# Patient Record
Sex: MALE | Race: WHITE | URBAN - METROPOLITAN AREA
[De-identification: names, ages, dates, MRNs, and addresses within clinical notes are randomized per-mention and may not be internally consistent; named-entity substitution may affect disease eponyms.]

---

## 2017-02-28 DIAGNOSIS — R05.9 COUGH: Primary | ICD-10-CM

## 2017-02-28 NOTE — PROGRESS NOTES
Verbal Order with read back per Dr. Heidy Hodge MD  For PFT smart panel. AMB POC PFT complete w/ bronchodilator  AMB POC PFT complete w/o bronchodilator    Dr. Heidy Hodge MD will co-sign the orders.

## 2017-03-06 ENCOUNTER — OFFICE VISIT (OUTPATIENT)
Dept: PULMONOLOGY | Age: 62
End: 2017-03-06

## 2017-03-06 VITALS
SYSTOLIC BLOOD PRESSURE: 150 MMHG | TEMPERATURE: 98.2 F | HEART RATE: 78 BPM | WEIGHT: 212 LBS | RESPIRATION RATE: 20 BRPM | BODY MASS INDEX: 33.27 KG/M2 | HEIGHT: 67 IN | OXYGEN SATURATION: 96 % | DIASTOLIC BLOOD PRESSURE: 90 MMHG

## 2017-03-06 DIAGNOSIS — R05.9 COUGH: ICD-10-CM

## 2017-03-06 DIAGNOSIS — J30.9 CHRONIC ALLERGIC RHINITIS: ICD-10-CM

## 2017-03-06 DIAGNOSIS — R05.8 UPPER AIRWAY COUGH SYNDROME: Primary | ICD-10-CM

## 2017-03-06 RX ORDER — FLUTICASONE PROPIONATE 50 MCG
2 SPRAY, SUSPENSION (ML) NASAL DAILY
Qty: 1 BOTTLE | Refills: 3 | Status: SHIPPED | OUTPATIENT
Start: 2017-03-06

## 2017-03-06 RX ORDER — FLUTICASONE PROPIONATE 50 MCG
2 SPRAY, SUSPENSION (ML) NASAL DAILY
Qty: 1 BOTTLE | Refills: 3 | Status: SHIPPED | OUTPATIENT
Start: 2017-03-06 | End: 2017-03-06 | Stop reason: CLARIF

## 2017-03-06 RX ORDER — ATORVASTATIN CALCIUM 40 MG/1
TABLET, FILM COATED ORAL DAILY
COMMUNITY

## 2017-03-06 RX ORDER — ESOMEPRAZOLE MAGNESIUM 40 MG/1
CAPSULE, DELAYED RELEASE ORAL DAILY
COMMUNITY

## 2017-03-06 NOTE — MR AVS SNAPSHOT
Visit Information Date & Time Provider Department Dept. Phone Encounter #  
 3/6/2017  3:00 PM Yang Medrano MD [facility] [id number] Upcoming Health Maintenance Date Due Hepatitis C Screening 1955 DTaP/Tdap/Td series (1 - Tdap) 8/1/1978 FOBT Q 1 YEAR AGE 50-75 8/1/2007 INFLUENZA AGE 9 TO ADULT 8/1/2016 Allergies as of 3/6/2017  Review Complete On: 3/6/2017 By: Yang Medrano MD  
  
 Severity Noted Reaction Type Reactions Penicillins Low 03/06/2017   Side Effect Rash Current Immunizations  Never Reviewed No immunizations on file. Not reviewed this visit You Were Diagnosed With   
  
 Codes Comments Upper airway cough syndrome    -  Primary ICD-10-CM: D70 ICD-9-CM: 786.2 Cough     ICD-10-CM: R05 ICD-9-CM: 728. 2 Chronic allergic rhinitis     ICD-10-CM: J30.9 ICD-9-CM: 477.9 Vitals BP Pulse Temp Resp Height(growth percentile) Weight(growth percentile) 150/90 (BP 1 Location: Left arm, BP Patient Position: At rest) 78 98.2 °F (36.8 °C) (Oral) 20 5' 7\" (1.702 m) 212 lb (96.2 kg) SpO2 BMI Smoking Status 96% 33.2 kg/m2 Never Smoker BMI and BSA Data Body Mass Index Body Surface Area  
 33.2 kg/m 2 2.13 m 2 Preferred Pharmacy Pharmacy Name Phone [facility], [address] [place] [address] [phone] Your Updated Medication List  
  
   
This list is accurate as of: 3/6/17  3:43 PM.  Always use your most recent med list.  
  
  
  
  
 esomeprazole 40 mg capsule Commonly known as:  [place] Take  by mouth daily. fluticasone 50 mcg/actuation nasal spray Commonly known as:  Domnick Means 2 Sprays by Both Nostrils route daily. LIPITOR 40 mg tablet Generic drug:  atorvastatin Take  by mouth daily. ZOCOR 80 mg tablet Generic drug:  simvastatin Take 80 mg by mouth nightly. ZOLOFT 50 mg tablet Generic drug:  sertraline Take  by mouth daily. Prescriptions Sent to Pharmacy Refills  
 fluticasone (FLONASE) 50 mcg/actuation nasal spray 3 Si Sprays by Both Nostrils route daily. Class: Normal  
 Pharmacy: [facility] [address] [place] [address], [address] [place] [address] #: [phone] Route: Both Nostrils We Performed the Following AMB POC PFT COMPLETE W/O BRONCHODILATOR [26631 CPT(R)] Introducing [facility] SERVICES! Reece Wade introduces [employer] patient portal. Now you can access parts of your medical record, email your doctor's office, and request medication refills online. 1. In your internet browser, go to [email or web address] 2. Click on the First Time User? Click Here link in the Sign In box. You will see the New Member Sign Up page. 3. Enter your TuneCore Access Code exactly as it appears below. You will not need to use this code after youve completed the sign-up process. If you do not sign up before the expiration date, you must request a new code. · TuneCore Access Code: [email or web address] Expires: 2017 10:09 AM 
 
4. Enter the last four digits of your Social Security Number (xxxx) and Date of Birth (mm/dd/yyyy) as indicated and click Submit. You will be taken to the next sign-up page. 5. Create a TuneCore ID. This will be your TuneCore login ID and cannot be changed, so think of one that is secure and easy to remember. 6. Create a TuneCore password. You can change your password at any time. 7. Enter your Password Reset Question and Answer. This can be used at a later time if you forget your password. 8. Enter your e-mail address. You will receive e-mail notification when new information is available in [address] 9. Click Sign Up. You can now view and download portions of your medical record.  
10. Click the Download Summary menu link to download a portable copy of your medical information. If you have questions, please visit the Frequently Asked Questions section of the Dapt website. Remember, Dapt is NOT to be used for urgent needs. For medical emergencies, dial 911. Now available from your iPhone and Android! Please provide this summary of care documentation to your next provider. If you have any questions after today's visit, please call [phone].

## 2017-03-06 NOTE — PROGRESS NOTES
HISTORY OF PRESENT ILLNESS  Ranjit Grandchild is a 63 y.o. male. HPI Comments: Pt presents with a dry bothersome cough with onset about 4 months ago. Seen in local urgent care, CXR done and started on antibiotics and cough suppressants without relief. Pt with occasional sternal pressure but no actual chest pain. Other associated symptoms include sinus pressure with HA and congestion, frequent sneezing and rhinorrhea. Pt also describes a feeling of \"stuff running down the back of my throat\". Pt with history of GERD on Nexium. Denies audible wheezing or SOB. Cough does not wake him up from sleep. Cough   The history is provided by the patient. This is a chronic problem. Episode onset: 4 months. The problem occurs daily. The problem has not changed since onset. Pertinent negatives include no chest pain, no abdominal pain, no headaches and no shortness of breath. Associated symptoms comments: Sternal pressure. Exacerbated by: unknown. Nothing relieves the symptoms. Treatments tried: antibiotics x 2 , Tessalon perles and Codeine. The treatment provided no relief. Review of Systems   Constitutional: Negative for chills, diaphoresis, fever, malaise/fatigue and weight loss. Weight gain   HENT: Positive for congestion. Negative for ear discharge, ear pain, hearing loss, nosebleeds, sore throat and tinnitus. Eyes: Positive for blurred vision. Negative for double vision, photophobia, pain, discharge and redness. Respiratory: Positive for cough. Negative for hemoptysis, sputum production, shortness of breath, wheezing and stridor. Cardiovascular: Negative for chest pain, palpitations, orthopnea, leg swelling and PND. Gastrointestinal: Negative for abdominal pain, heartburn, nausea and vomiting. Musculoskeletal: Negative for back pain, myalgias and neck pain. Skin: Negative for itching and rash. Neurological: Negative for speech change, focal weakness, weakness and headaches. Endo/Heme/Allergies: Negative for environmental allergies. Does not bruise/bleed easily. Psychiatric/Behavioral: Negative for hallucinations, memory loss, substance abuse and suicidal ideas. Depression: resolved. The patient is not nervous/anxious and does not have insomnia. Past Medical History:   Diagnosis Date    GERD (gastroesophageal reflux disease)      History reviewed. No pertinent surgical history. Current Outpatient Prescriptions on File Prior to Visit   Medication Sig Dispense Refill    sertraline (ZOLOFT) 50 mg tablet Take  by mouth daily.  simvastatin (ZOCOR) 80 mg tablet Take 80 mg by mouth nightly. No current facility-administered medications on file prior to visit. Allergies   Allergen Reactions    Penicillins Rash     Family History   Problem Relation Age of Onset    Alcohol abuse Father     Cancer Father     Lung Disease Father     Diabetes Maternal Grandmother     Psychiatric Disorder Maternal Grandmother     Diabetes Maternal Grandfather     Diabetes Paternal Grandmother     Diabetes Paternal Grandfather     No Known Problems Mother      Social History     Social History    Marital status:      Spouse name: N/A    Number of children: N/A    Years of education: N/A     Occupational History    Not on file. Social History Main Topics    Smoking status: Never Smoker    Smokeless tobacco: Never Used    Alcohol use Yes    Drug use: No    Sexual activity: Not on file     Other Topics Concern    Not on file     Social History Narrative    Retired , denies exposure to chemicals or fumes    Has cats at home, no dogs or birds     Blood pressure 150/90, pulse 78, temperature 98.2 °F (36.8 °C), temperature source Oral, resp. rate 20, height 5' 7\" (1.702 m), weight 96.2 kg (212 lb), SpO2 96 %. Physical Exam   Constitutional: He is oriented to person, place, and time. He appears well-developed. No distress.    Overweight    HENT:   Head: Normocephalic and atraumatic. Mouth/Throat: Oropharynx is clear and moist. No oropharyngeal exudate. Nasopharynx erythematous and inflamed   Eyes: Conjunctivae and EOM are normal. Pupils are equal, round, and reactive to light. Right eye exhibits no discharge. Left eye exhibits no discharge. No scleral icterus. Neck: No JVD present. No tracheal deviation present. No thyromegaly present. Cardiovascular: Normal rate, regular rhythm, normal heart sounds and intact distal pulses. Exam reveals no gallop. No murmur heard. Pulmonary/Chest: Effort normal and breath sounds normal. No stridor. No respiratory distress. He has no wheezes. He has no rales. He exhibits no tenderness. Abdominal: Soft. He exhibits no mass. There is no tenderness. Musculoskeletal: He exhibits no edema or tenderness. Lymphadenopathy:     He has no cervical adenopathy. Neurological: He is alert and oriented to person, place, and time. Skin: Skin is warm and dry. No rash noted. He is not diaphoretic. No erythema. Psychiatric: He has a normal mood and affect. His behavior is normal. Judgment and thought content normal.     Spirometry: normal flows  ASSESSMENT and PLAN  Encounter Diagnoses   Name Primary?  Upper airway cough syndrome Yes    Cough     Chronic allergic rhinitis      Cough most likely due to UACS. DDx includes GERD and bronchospasm although less likely. Will start intranasal steroids, see Rx. Pt also instructed to start pm nasal saline rinses. Pt instructed on porter of both devices. RTC 3 months.

## 2017-03-06 NOTE — PROGRESS NOTES
Chief Complaint   Patient presents with    Cough     Patient referred by Dr. Timur Guerra for cough. Cough starting in Nov 2016. Patient had cxr [facility]. Patient had Spirometry today in office.